# Patient Record
Sex: MALE | Race: WHITE | NOT HISPANIC OR LATINO | Employment: FULL TIME | URBAN - METROPOLITAN AREA
[De-identification: names, ages, dates, MRNs, and addresses within clinical notes are randomized per-mention and may not be internally consistent; named-entity substitution may affect disease eponyms.]

---

## 2019-06-22 ENCOUNTER — OFFICE VISIT (OUTPATIENT)
Dept: URGENT CARE | Facility: CLINIC | Age: 69
End: 2019-06-22
Payer: MEDICARE

## 2019-06-22 VITALS
SYSTOLIC BLOOD PRESSURE: 144 MMHG | BODY MASS INDEX: 32.2 KG/M2 | HEART RATE: 74 BPM | WEIGHT: 230 LBS | DIASTOLIC BLOOD PRESSURE: 86 MMHG | TEMPERATURE: 98 F | HEIGHT: 71 IN | OXYGEN SATURATION: 100 %

## 2019-06-22 DIAGNOSIS — L25.9 CONTACT DERMATITIS, UNSPECIFIED CONTACT DERMATITIS TYPE, UNSPECIFIED TRIGGER: Primary | ICD-10-CM

## 2019-06-22 PROCEDURE — 99203 OFFICE O/P NEW LOW 30 MIN: CPT | Mod: ICN,CMP,S$GLB, | Performed by: PHYSICIAN ASSISTANT

## 2019-06-22 PROCEDURE — 99203 PR OFFICE/OUTPT VISIT, NEW, LEVL III, 30-44 MIN: ICD-10-PCS | Mod: ICN,CMP,S$GLB, | Performed by: PHYSICIAN ASSISTANT

## 2019-06-22 RX ORDER — HYDROXYZINE PAMOATE 25 MG/1
25 CAPSULE ORAL 4 TIMES DAILY
Qty: 30 CAPSULE | Refills: 1 | Status: SHIPPED | OUTPATIENT
Start: 2019-06-22

## 2019-06-22 RX ORDER — ASPIRIN 81 MG/1
81 TABLET ORAL DAILY
COMMUNITY

## 2019-06-22 RX ORDER — ATORVASTATIN CALCIUM 20 MG/1
20 TABLET, FILM COATED ORAL DAILY
COMMUNITY

## 2019-06-22 RX ORDER — TAMSULOSIN HYDROCHLORIDE 0.4 MG/1
0.4 CAPSULE ORAL DAILY
COMMUNITY

## 2019-06-22 RX ORDER — OMEPRAZOLE 20 MG/1
20 CAPSULE, DELAYED RELEASE ORAL DAILY
COMMUNITY

## 2019-06-22 RX ORDER — PREDNISONE 10 MG/1
TABLET ORAL
Qty: 7 TABLET | Refills: 0 | Status: SHIPPED | OUTPATIENT
Start: 2019-06-22

## 2019-06-22 RX ORDER — DILTIAZEM HYDROCHLORIDE 180 MG/1
180 CAPSULE, EXTENDED RELEASE ORAL DAILY
COMMUNITY

## 2019-06-22 RX ORDER — LOSARTAN POTASSIUM 25 MG/1
25 TABLET ORAL DAILY
COMMUNITY

## 2019-06-22 NOTE — PATIENT INSTRUCTIONS
Please monitor glucose and adjust insulin as needed. Take as little steroid you need for symptoms relief.       Poison Ivy Rash  You have a rash and itching. This is a delayed reaction to the oils of the poison ivy plant. You likely came in contact with it during the 3 days before your symptoms began. Your skin will become red and itchy. Small blisters may appear. These can break and leak a clear yellow fluid. This fluid is not contagious. The reaction usually starts to go away after 1 to 2 weeks. But it may take 4 to 6 weeks to fully clear.    Home care  Follow these guidelines when caring for yourself at home:  · The plant oils still on your skin or clothes can be spread to other places on your body. They can also be passed on to other people and cause a similar reaction. Thats why its important to wash all of the plant oils off your skin and any clothes that may have been exposed. Wash all clothes that you were wearing. Use hot water with ordinary laundry detergent.  · Don't use over-the-counter creams that have neomycin or bacitracin. These may make the rash worse.  · Avoid anything that heats up your skin. This includes hot showers or baths, or direct sunlight. These can make itching worse.  · Put a cold compress on areas that are leaking (weeping), or on blistered areas. Do this for 30 minutes 3 times a day. To make a cold compress, dip a wash cloth in a mixture of 1 pint of cold water and 1 packet of astringent or oatmeal bath powder. Keep the solution in the refrigerator for future use.  · If large areas of skin are affected, take a lukewarm bath. Add colloidal oatmeal, or 1 cup of cornstarch or baking soda to the water.  · For a rash in a smaller area, use hydrocortisone cream for redness and irritation. But dont use this if another medicine was prescribed. For severe itching, put an ice pack on the area. To make an ice pack, put ice cubes in a plastic bag that seals at the top. Wrap the bag in a clean,  thin towel or cloth. Never put ice or an ice pack directly on the skin. Over-the-counter products that have calamine lotion may also be helpful.  · You can also use an oral antihistamine medicine with diphenhydramine for itching, unless another medicine was prescribed. This medicine may make you sleepy. So use lower doses during the daytime and higher doses at bedtime. Dont use medicine that has diphenhydramine if you have glaucoma. Also dont use it if you are a man who has trouble urinating because of an enlarged prostate. Antihistamines with loratidine cause less drowsiness. They are a good choice for daytime use.  · For severe cases, your provider may prescribe oral steroid medicines. Always take these exactly as prescribed.  Follow-up care  Follow up with your healthcare provider, or as directed. Call your provider if your rash gets worse or you are not starting to get better after 1 week of treatment.  When to seek medical advice  Call your healthcare provider right away if any of these occur:  · Spreading facial rash with swollen mouth or eyelids  · Rash that spreads to the groin and causes swelling of the penis, scrotum, or vaginal area  · Trouble urinating because of swelling in the genital area  Also call your provider if you have signs of infection in the areas of broken blisters:  · Spreading redness  · Pus or fluid draining from the blisters  · Yellow-brown crusts form over the open blisters  · Fever of 1 degree, or higher, above your normal temperature, or as directed by your provider  Call 911  Call 911 if you have severe swelling on your face, eyelids, mouth, throat, or tongue.  Date Last Reviewed: 8/1/2016 © 2000-2017 [employer]. [address] [place] [address]. All rights reserved. This information is not intended as a substitute for professional medical care. Always follow your healthcare professional's instructions.       If not allergic,take tylenol (acetominophen) for  fever control, chills, or body aches every 4 hours. Do not exceed 4000 mg/ day.If not allergic, take Motrin (Ibuprofen) every 4 hours for fever, chills, pain or inflammation. Do not exceed 2400 mg/day. You can alternate taking tylenol and motrin.  If you were prescribed a narcotic medication, do not drive or operate heavy equipment or machinery while taking these medications.  You must understand that you've received an Urgent Care treatment only and that you may be released before all your medical problems are known or treated. You, the patient, will arrange for follow up care as instructed.  Follow up with your PCP or specialty clinic as directed in the next 1-2 weeks if not improved or as needed.  You can call [phone] to schedule an appointment with the appropriate provider.  If your condition worsens we recommend that you receive another evaluation at the emergency room immediately or contact your primary medical clinics after hours call service to discuss your concerns.  Please return here or go to the Emergency Department for any concerns or worsening of condition.

## 2019-06-22 NOTE — PROGRESS NOTES
"Subjective:       Patient ID: Stalin Braswell is a 69 y.o. male.    Vitals:  height is 5' 10.5" (1.791 m) and weight is 104.3 kg (230 lb). His oral temperature is 98 °F (36.7 °C). His blood pressure is 144/86 (abnormal) and his pulse is 74. His oxygen saturation is 100%.     Chief Complaint: Edema    x4 days pt states began with both eyes itching, now both eyelids are swelling, he has a rash on groin and on his left forearm, all only itch no pain. Pt states cause unknown. Last Benadryl dose today at 11.    Edema   This is a new problem. The current episode started in the past 7 days. The problem occurs constantly. The problem has been gradually worsening. Associated symptoms include a rash. Pertinent negatives include no arthralgias, chills, coughing, fever, joint swelling or sore throat. Nothing aggravates the symptoms. The treatment provided no relief.       Constitution: Negative for chills and fever.   HENT: Negative for facial swelling and sore throat.    Neck: Negative for painful lymph nodes.   Eyes: Positive for eye itching and eyelid swelling.   Respiratory: Negative for cough.    Musculoskeletal: Negative for joint pain and joint swelling.   Skin: Positive for rash. Negative for color change, pale, wound, abrasion, laceration, lesion, skin thickening/induration, puncture wound, erythema, abscess, avulsion and hives.   Allergic/Immunologic: Positive for itching. Negative for environmental allergies, immunocompromised state and hives.   Hematologic/Lymphatic: Negative for swollen lymph nodes.       Objective:      Physical Exam   Constitutional: He is oriented to person, place, and time. He appears well-developed and well-nourished.   HENT:   Head: Normocephalic and atraumatic. Head is without abrasion, without contusion and without laceration.   Right Ear: External ear normal.   Left Ear: External ear normal.   Nose: Nose normal.   Mouth/Throat: Oropharynx is clear and moist.   Eyes: Pupils are equal, " round, and reactive to light. Conjunctivae, EOM and lids are normal.   Neck: Trachea normal, full passive range of motion without pain and phonation normal. Neck supple.   Cardiovascular: Normal rate, regular rhythm and normal heart sounds.   Pulmonary/Chest: Effort normal and breath sounds normal. No stridor. No respiratory distress.   Musculoskeletal: Normal range of motion.   Neurological: He is alert and oriented to person, place, and time.   Skin: Skin is warm, dry and intact. Capillary refill takes less than 2 seconds. Rash noted. No abrasion, no bruising, no burn, no ecchymosis, no laceration and no lesion noted. Rash is maculopapular and urticarial. No erythema.        Psychiatric: He has a normal mood and affect. His speech is normal and behavior is normal. Judgment and thought content normal. Cognition and memory are normal.   Nursing note and vitals reviewed.      Assessment:       1. Contact dermatitis, unspecified contact dermatitis type, unspecified trigger        Plan:         Contact dermatitis, unspecified contact dermatitis type, unspecified trigger  -     predniSONE (DELTASONE) 10 MG tablet; Take one pill x 4 days, then 1/2 pill until empty. If glucose raises too high, taper off as soon as possible as discussed.  Dispense: 7 tablet; Refill: 0  -     hydrOXYzine pamoate (VISTARIL) 25 MG Cap; Take 1 capsule (25 mg total) by mouth 4 (four) times daily.  Dispense: 30 capsule; Refill: 1    r/b of steroid use discussed; patient v/u. Will take minimum effective dose. Patient very well compliant with glucose, insulin monitoring.     Patient Instructions   Please monitor glucose and adjust insulin as needed. Take as little steroid you need for symptoms relief.       Poison Ivy Rash  You have a rash and itching. This is a delayed reaction to the oils of the poison ivy plant. You likely came in contact with it during the 3 days before your symptoms began. Your skin will become red and itchy. Small blisters  may appear. These can break and leak a clear yellow fluid. This fluid is not contagious. The reaction usually starts to go away after 1 to 2 weeks. But it may take 4 to 6 weeks to fully clear.    Home care  Follow these guidelines when caring for yourself at home:  · The plant oils still on your skin or clothes can be spread to other places on your body. They can also be passed on to other people and cause a similar reaction. Thats why its important to wash all of the plant oils off your skin and any clothes that may have been exposed. Wash all clothes that you were wearing. Use hot water with ordinary laundry detergent.  · Don't use over-the-counter creams that have neomycin or bacitracin. These may make the rash worse.  · Avoid anything that heats up your skin. This includes hot showers or baths, or direct sunlight. These can make itching worse.  · Put a cold compress on areas that are leaking (weeping), or on blistered areas. Do this for 30 minutes 3 times a day. To make a cold compress, dip a wash cloth in a mixture of 1 pint of cold water and 1 packet of astringent or oatmeal bath powder. Keep the solution in the refrigerator for future use.  · If large areas of skin are affected, take a lukewarm bath. Add colloidal oatmeal, or 1 cup of cornstarch or baking soda to the water.  · For a rash in a smaller area, use hydrocortisone cream for redness and irritation. But dont use this if another medicine was prescribed. For severe itching, put an ice pack on the area. To make an ice pack, put ice cubes in a plastic bag that seals at the top. Wrap the bag in a clean, thin towel or cloth. Never put ice or an ice pack directly on the skin. Over-the-counter products that have calamine lotion may also be helpful.  · You can also use an oral antihistamine medicine with diphenhydramine for itching, unless another medicine was prescribed. This medicine may make you sleepy. So use lower doses during the daytime and higher  doses at bedtime. Dont use medicine that has diphenhydramine if you have glaucoma. Also dont use it if you are a man who has trouble urinating because of an enlarged prostate. Antihistamines with loratidine cause less drowsiness. They are a good choice for daytime use.  · For severe cases, your provider may prescribe oral steroid medicines. Always take these exactly as prescribed.  Follow-up care  Follow up with your healthcare provider, or as directed. Call your provider if your rash gets worse or you are not starting to get better after 1 week of treatment.  When to seek medical advice  Call your healthcare provider right away if any of these occur:  · Spreading facial rash with swollen mouth or eyelids  · Rash that spreads to the groin and causes swelling of the penis, scrotum, or vaginal area  · Trouble urinating because of swelling in the genital area  Also call your provider if you have signs of infection in the areas of broken blisters:  · Spreading redness  · Pus or fluid draining from the blisters  · Yellow-brown crusts form over the open blisters  · Fever of 1 degree, or higher, above your normal temperature, or as directed by your provider  Call 911  Call 911 if you have severe swelling on your face, eyelids, mouth, throat, or tongue.  Date Last Reviewed: 8/1/2016 © 2000-2017 [employer]. [address] [place] [address]. All rights reserved. This information is not intended as a substitute for professional medical care. Always follow your healthcare professional's instructions.       If not allergic,take tylenol (acetominophen) for fever control, chills, or body aches every 4 hours. Do not exceed 4000 mg/ day.If not allergic, take Motrin (Ibuprofen) every 4 hours for fever, chills, pain or inflammation. Do not exceed 2400 mg/day. You can alternate taking tylenol and motrin.  If you were prescribed a narcotic medication, do not drive or operate heavy equipment or machinery while  taking these medications.  You must understand that you've received an Urgent Care treatment only and that you may be released before all your medical problems are known or treated. You, the patient, will arrange for follow up care as instructed.  Follow up with your PCP or specialty clinic as directed in the next 1-2 weeks if not improved or as needed.  You can call [phone] to schedule an appointment with the appropriate provider.  If your condition worsens we recommend that you receive another evaluation at the emergency room immediately or contact your primary medical clinics after hours call service to discuss your concerns.  Please return here or go to the Emergency Department for any concerns or worsening of condition.

## 2019-06-25 ENCOUNTER — TELEPHONE (OUTPATIENT)
Dept: URGENT CARE | Facility: CLINIC | Age: 69
End: 2019-06-25

## 2022-07-03 RX ORDER — ONDANSETRON 4 MG/1
TABLET, FILM COATED ORAL
COMMUNITY
Start: 2021-10-15

## 2022-07-03 RX ORDER — TRAMADOL HYDROCHLORIDE 50 MG/1
TABLET ORAL
COMMUNITY

## 2022-07-03 RX ORDER — MAGNESIUM OXIDE 400 MG/1
TABLET ORAL
COMMUNITY

## 2022-07-03 RX ORDER — SEMAGLUTIDE 1.34 MG/ML
INJECTION, SOLUTION SUBCUTANEOUS
COMMUNITY
Start: 2022-05-10 | End: 2022-08-25

## 2022-07-03 RX ORDER — PANTOPRAZOLE SODIUM 40 MG/1
TABLET, DELAYED RELEASE ORAL
COMMUNITY

## 2022-07-03 RX ORDER — ACETAMINOPHEN 500 MG
TABLET ORAL
COMMUNITY

## 2022-07-03 RX ORDER — INSULIN ASPART 100 [IU]/ML
INJECTION, SOLUTION INTRAVENOUS; SUBCUTANEOUS
COMMUNITY

## 2022-07-03 RX ORDER — FLUCONAZOLE 150 MG/1
TABLET ORAL
COMMUNITY

## 2022-07-03 RX ORDER — MONTELUKAST SODIUM 5 MG/1
TABLET, CHEWABLE ORAL
COMMUNITY

## 2022-07-03 RX ORDER — LEVOTHYROXINE SODIUM 0.05 MG/1
TABLET ORAL
COMMUNITY

## 2022-07-03 RX ORDER — PREDNISONE 1 MG/1
TABLET ORAL
COMMUNITY

## 2022-07-03 RX ORDER — TAMSULOSIN HYDROCHLORIDE 0.4 MG/1
CAPSULE ORAL
COMMUNITY

## 2022-07-03 RX ORDER — MYCOPHENOLATE MOFETIL 250 MG/1
CAPSULE ORAL
COMMUNITY

## 2022-07-03 RX ORDER — AMLODIPINE BESYLATE 5 MG/1
TABLET ORAL
COMMUNITY

## 2022-07-03 RX ORDER — PSEUDOEPHEDRINE HCL 30 MG
TABLET ORAL
COMMUNITY

## 2022-07-03 RX ORDER — ATORVASTATIN CALCIUM 80 MG/1
TABLET, FILM COATED ORAL
COMMUNITY

## 2022-08-25 PROBLEM — N28.89 HYPERTENSION SECONDARY TO OTHER RENAL DISORDERS: Status: ACTIVE | Noted: 2022-08-25

## 2022-08-25 PROBLEM — I48.91 ATRIAL FIBRILLATION (HCC): Status: ACTIVE | Noted: 2022-08-25

## 2022-08-25 PROBLEM — D47.2 MGUS (MONOCLONAL GAMMOPATHY OF UNKNOWN SIGNIFICANCE): Status: ACTIVE | Noted: 2022-08-25

## 2022-08-25 PROBLEM — E11.9 TYPE 2 DIABETES MELLITUS (HCC): Status: ACTIVE | Noted: 2022-08-25

## 2022-08-25 PROBLEM — E78.5 HYPERLIPIDEMIA: Status: ACTIVE | Noted: 2022-08-25

## 2022-08-25 PROBLEM — I10 ESSENTIAL HYPERTENSION: Status: ACTIVE | Noted: 2022-08-25

## 2022-08-25 PROBLEM — Z94.0 HISTORY OF KIDNEY TRANSPLANT: Status: ACTIVE | Noted: 2022-08-25

## 2022-08-25 PROBLEM — I15.1 HYPERTENSION SECONDARY TO OTHER RENAL DISORDERS: Status: ACTIVE | Noted: 2022-08-25

## 2025-02-06 ENCOUNTER — OFFICE VISIT (OUTPATIENT)
Dept: FAMILY MEDICINE | Facility: CLINIC | Age: 75
End: 2025-02-06
Payer: MEDICARE

## 2025-02-06 VITALS
OXYGEN SATURATION: 97 % | SYSTOLIC BLOOD PRESSURE: 130 MMHG | WEIGHT: 210.13 LBS | HEART RATE: 74 BPM | DIASTOLIC BLOOD PRESSURE: 68 MMHG | BODY MASS INDEX: 30.15 KG/M2

## 2025-02-06 DIAGNOSIS — Z91.81 HISTORY OF FALL: ICD-10-CM

## 2025-02-06 DIAGNOSIS — F43.9 STRESS AT HOME: ICD-10-CM

## 2025-02-06 DIAGNOSIS — Z79.52 LONG TERM (CURRENT) USE OF SYSTEMIC STEROIDS: ICD-10-CM

## 2025-02-06 DIAGNOSIS — Z76.89 ENCOUNTER TO ESTABLISH CARE WITH NEW DOCTOR: Primary | ICD-10-CM

## 2025-02-06 PROBLEM — E83.42 HYPOMAGNESEMIA: Status: ACTIVE | Noted: 2025-02-06

## 2025-02-06 PROBLEM — I25.10 CAD (CORONARY ARTERY DISEASE), NATIVE CORONARY ARTERY: Status: ACTIVE | Noted: 2018-05-01

## 2025-02-06 PROBLEM — E83.51 HYPOCALCEMIA: Status: ACTIVE | Noted: 2025-02-06

## 2025-02-06 PROBLEM — Z79.4 TYPE 2 DIABETES MELLITUS WITH HYPERGLYCEMIA, WITH LONG-TERM CURRENT USE OF INSULIN: Status: ACTIVE | Noted: 2025-02-06

## 2025-02-06 PROBLEM — E11.65 TYPE 2 DIABETES MELLITUS WITH HYPERGLYCEMIA, WITH LONG-TERM CURRENT USE OF INSULIN: Status: ACTIVE | Noted: 2025-02-06

## 2025-02-06 PROBLEM — Z85.828 HISTORY OF SKIN CANCER: Status: ACTIVE | Noted: 2025-02-06

## 2025-02-06 PROBLEM — R73.9 STEROID-INDUCED HYPERGLYCEMIA: Status: ACTIVE | Noted: 2025-02-06

## 2025-02-06 PROBLEM — I48.0 PAROXYSMAL ATRIAL FIBRILLATION: Status: ACTIVE | Noted: 2025-02-06

## 2025-02-06 PROBLEM — T38.0X5A STEROID-INDUCED HYPERGLYCEMIA: Status: ACTIVE | Noted: 2025-02-06

## 2025-02-06 PROBLEM — J45.20 MILD INTERMITTENT ASTHMA WITHOUT COMPLICATION: Status: ACTIVE | Noted: 2025-02-06

## 2025-02-06 PROBLEM — E78.2 MIXED HYPERLIPIDEMIA: Status: ACTIVE | Noted: 2023-06-05

## 2025-02-06 PROBLEM — I25.2 HISTORY OF HEART ATTACK: Status: ACTIVE | Noted: 2025-02-06

## 2025-02-06 PROBLEM — Z94.0 KIDNEY TRANSPLANT STATUS, LIVING UNRELATED DONOR: Status: ACTIVE | Noted: 2025-02-06

## 2025-02-06 PROBLEM — R91.1 LUNG NODULE: Status: ACTIVE | Noted: 2025-02-06

## 2025-02-06 PROBLEM — B45.9 CRYPTOCOCCUS: Status: ACTIVE | Noted: 2025-02-06

## 2025-02-06 PROBLEM — E87.6 HYPOKALEMIA: Status: ACTIVE | Noted: 2025-02-06

## 2025-02-06 PROCEDURE — 99204 OFFICE O/P NEW MOD 45 MIN: CPT | Mod: S$PBB,,, | Performed by: STUDENT IN AN ORGANIZED HEALTH CARE EDUCATION/TRAINING PROGRAM

## 2025-02-06 PROCEDURE — G2211 COMPLEX E/M VISIT ADD ON: HCPCS | Mod: S$PBB,,, | Performed by: STUDENT IN AN ORGANIZED HEALTH CARE EDUCATION/TRAINING PROGRAM

## 2025-02-06 PROCEDURE — 99214 OFFICE O/P EST MOD 30 MIN: CPT | Mod: PBBFAC,PO | Performed by: STUDENT IN AN ORGANIZED HEALTH CARE EDUCATION/TRAINING PROGRAM

## 2025-02-06 RX ORDER — AMMONIUM LACTATE 12 G/100G
LOTION TOPICAL
COMMUNITY
Start: 2024-12-30

## 2025-02-06 RX ORDER — PREDNISONE 5 MG/1
5 TABLET ORAL
COMMUNITY
End: 2025-02-06 | Stop reason: ALTCHOICE

## 2025-02-06 RX ORDER — MELOXICAM 15 MG/1
15 TABLET ORAL
COMMUNITY
Start: 2025-01-27 | End: 2025-02-06 | Stop reason: ALTCHOICE

## 2025-02-06 RX ORDER — METOPROLOL SUCCINATE 25 MG/1
25 TABLET, EXTENDED RELEASE ORAL
COMMUNITY
Start: 2024-12-09

## 2025-02-06 RX ORDER — SEMAGLUTIDE 2.68 MG/ML
2 INJECTION, SOLUTION SUBCUTANEOUS
COMMUNITY

## 2025-02-06 RX ORDER — TACROLIMUS 1 MG/1
2 CAPSULE ORAL
COMMUNITY

## 2025-02-06 RX ORDER — MUPIROCIN 20 MG/G
OINTMENT TOPICAL
COMMUNITY

## 2025-02-06 RX ORDER — TRAMADOL HYDROCHLORIDE 50 MG/1
TABLET ORAL
COMMUNITY

## 2025-02-06 RX ORDER — INSULIN PMP CART,AUT,G6/7,CNTR
EACH SUBCUTANEOUS
COMMUNITY
Start: 2025-01-29

## 2025-02-06 RX ORDER — ACETAMINOPHEN 500 MG
TABLET ORAL
COMMUNITY

## 2025-02-06 RX ORDER — INSULIN ASPART 100 [IU]/ML
INJECTION, SOLUTION INTRAVENOUS; SUBCUTANEOUS
COMMUNITY

## 2025-02-06 RX ORDER — MYCOPHENOLATE MOFETIL 250 MG/1
CAPSULE ORAL
COMMUNITY

## 2025-02-06 RX ORDER — AMLODIPINE BESYLATE 5 MG/1
5 TABLET ORAL
COMMUNITY
Start: 2024-12-09

## 2025-02-06 RX ORDER — ATORVASTATIN CALCIUM 40 MG/1
40 TABLET, FILM COATED ORAL
COMMUNITY
Start: 2024-03-04

## 2025-02-06 RX ORDER — FOLIC ACID 1 MG/1
1 TABLET ORAL
COMMUNITY
Start: 2024-12-09

## 2025-02-06 RX ORDER — FAMOTIDINE 20 MG/1
20 TABLET, FILM COATED ORAL 2 TIMES DAILY
COMMUNITY
Start: 2024-12-09

## 2025-02-06 RX ORDER — ALBUTEROL SULFATE 90 UG/1
INHALANT RESPIRATORY (INHALATION) EVERY 4 HOURS PRN
COMMUNITY

## 2025-02-06 RX ORDER — DIPHENHYDRAMINE HCL 25 MG
25 TABLET ORAL
COMMUNITY

## 2025-02-06 RX ORDER — LANOLIN ALCOHOL/MO/W.PET/CERES
400 CREAM (GRAM) TOPICAL
COMMUNITY

## 2025-02-06 RX ORDER — LEVOTHYROXINE SODIUM 50 UG/1
50 TABLET ORAL
COMMUNITY
Start: 2024-03-29

## 2025-02-06 RX ORDER — MONTELUKAST SODIUM 10 MG/1
10 TABLET ORAL
COMMUNITY

## 2025-02-13 ENCOUNTER — PATIENT OUTREACH (OUTPATIENT)
Dept: ADMINISTRATIVE | Facility: HOSPITAL | Age: 75
End: 2025-02-13
Payer: MEDICARE

## 2025-02-13 NOTE — PROGRESS NOTES
Population Health Chart Review & Patient Outreach Details      Additional Pop Health Notes:               Updates Requested / Reviewed:      Updated Care Coordination Note and External Sources: [facility] and [facility]         Health Maintenance Topics Overdue:      VB Score: 3     Eye Exam  Hemoglobin A1c  Foot Exam    Influenza Vaccine  Shingles/Zoster Vaccine  RSV Vaccine                  Health Maintenance Topic(s) Outreach Outcomes & Actions Taken:    Lab(s) - Outreach Outcomes & Actions Taken  : External Records Requested & Care Team Updated if Applicable    Diabetic Foot Exam - Outreach Outcomes & Actions Taken  : External Records Requested & Care Team Updated if Applicable

## 2025-02-16 PROBLEM — Z79.52 LONG TERM (CURRENT) USE OF SYSTEMIC STEROIDS: Status: ACTIVE | Noted: 2025-02-16

## 2025-02-16 PROBLEM — F43.9 STRESS AT HOME: Status: ACTIVE | Noted: 2025-02-16

## 2025-02-16 PROBLEM — R80.8 OTHER PROTEINURIA: Status: ACTIVE | Noted: 2025-02-16

## 2025-02-17 NOTE — PROGRESS NOTES
Subjective:       Patient ID: Stalin Braswell is a 74 y.o. male who presents to [facility] care. He is new to me and new to this clinic. He has moved here from [place]    Chief Complaint: [facility] Care and Referral    CARDIOVASCULAR:  He has a history of myocardial infarction in 2019 with minimal symptoms at presentation, receiving one stent in [place], [place]. He has a history of atrial fibrillation, with only one documented episode during a CT for Cryptococcus evaluation over three years of continuous cardiac monitoring.    RENAL:  He received a kidney transplant from his wife on December 31, 2019. His creatinine improved from 4-6 to 1-3 within 24 hours post-transplant. He denies any rejection issues and continues follow-up care with [facility] through telehealth appointments and annual in-person visits.    DIABETES:  He recently transitioned from a Medtronic insulin pump to an Omnipod approximately 1.5 months ago, reporting improved ease of use and logic in operation.    RESPIRATORY:  He has a history of lung nodules, which remain unchanged on recent CT. 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 He experiences intermittent asthma symptoms triggered by cold weather or physical exertion, particularly when climbing hills or mountains.    NEUROLOGICAL:  He reports balance issues and short-term disorientation, particularly when changing positions quickly or in dark environments without visual reference points.    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       SOCIAL HISTORY:  He requests referral for marriage counseling.    Plan:  Refer to behavioral health for marriage counseling.  Refer to PT for history of fall.  Stop by the [facility] to get connected to the  portal with [facility].  Return in one month or sooner if needed.  Continues to follow up with [facility] transplant team.  On follow up consider calculate FRAX score due to long term use of steroids.  On follow up will likely need multiple subspecialty referrals - Endocrinology on insulin pump and Synthroid, Dermatology for history of skin cancer, Cardiology for CAD, history of MI, and paroxysmal atrial fibrillation.  Reach out with questions through the portal or call.    Past Medical History:   Diagnosis Date    Diabetes mellitus, type 2     H/O heart artery stent     Heart attack     Kidney disease        Past Surgical History:   Procedure Laterality Date    APPENDECTOMY      BICEPS TENDON REPAIR Left     CATARACT EXTRACTION, BILATERAL      CORONARY ANGIOPLASTY WITH STENT PLACEMENT  04/2018    kidney transplant with flap  04/2019    SURGICAL REMOVAL OF EXOSTOSIS OF TOE Left     2nd toe on left foot    TOE AMPUTATION      glass in it       Review of patient's allergies indicates:   Allergen Reactions    Prednisone Other (See Comments)     Pt states makes his glucose levels increase       Social History[1]    Medications Ordered Prior to Encounter[2]    Family History   Problem Relation Name Age of Onset    No Known Problems Mother      No Known Problems Father         Review of Systems    Objective:      /68   Pulse 74   Wt 95.3 kg (210 lb 1.6 oz)   SpO2 97%   BMI 30.15 kg/m²   Physical Exam    Assessment:           Plan:       Encounter to establish care with new doctor  - Stop by the [facility] to get connected to the portal with [facility].  - Return in one month or sooner if needed.  - Continues to follow up with [facility] transplant team, history of kidney transplant.  - On follow up consider calculate FRAX score due to long term use of steroids.  - On follow up will likely need multiple subspecialty referrals - Endocrinology on insulin pump and Synthroid, Dermatology for history of skin cancer, Cardiology for CAD, history of  MI, and paroxysmal atrial fibrillation.  - Reach out with questions through the portal or call.    History of fall  -     Ambulatory referral/consult to Physical/Occupational Therapy; Future; Expected date: 02/13/2025    Stress at home  -     Ambulatory referral/consult to Behavioral Health; Future; Expected date: 02/13/2025    Long term (current) use of systemic steroids  - On follow up consider calculation of FRAX score.      Counseled on regular exercise, maintenance of a healthy weight, balanced diet rich in fruits/vegetables and lean protein, and avoidance of unhealthy habits like smoking and excessive alcohol intake.      Follow up in about 4 weeks (around 3/6/2025) for return for frax score.    This note was generated with the assistance of ambient listening technology. Verbal consent was obtained by the patient and accompanying visitor(s) for the recording of patient appointment to facilitate this note. I attest to having reviewed and edited the generated note for accuracy, though some syntax or spelling errors may persist. Please contact the author of this note for any clarification.    Visit today included increased complexity associated with the care of the episodic problem stress at home addressed and managing the longitudinal care of the patient due to the serious and/or complex managed problem(s) long term (current) use of systemic steroids.             [1]   Social History  Socioeconomic History    Marital status:    Tobacco Use    Smoking status: Never    Smokeless tobacco: Never     Social Drivers of Health     Financial Resource Strain: Low Risk  (2/6/2025)    Overall Financial Resource Strain (CARDIA)     Difficulty of Paying Living Expenses: Not hard at all   Food Insecurity: No Food Insecurity (2/6/2025)    Hunger Vital Sign     Worried About Running Out of Food in the Last Year: Never true     Ran Out of Food in the Last Year: Never true   Physical Activity: Sufficiently Active  (2/6/2025)    Exercise Vital Sign     Days of Exercise per Week: 5 days     Minutes of Exercise per Session: 30 min   Stress: Stress Concern Present (2/6/2025)    [employer] - Occupational Stress Questionnaire     Feeling of Stress : Rather much   Housing Stability: Unknown (2/6/2025)    Housing Stability Vital Sign     Unable to Pay for Housing in the Last Year: No   [2]   Current Outpatient Medications on File Prior to Visit   Medication Sig Dispense Refill    acetaminophen (TYLENOL) 500 MG tablet every 4 hours as needed (Patient taking differently: as needed.)      albuterol (PROVENTIL/VENTOLIN HFA) 90 mcg/actuation inhaler Inhale into the lungs every 4 (four) hours as needed.      amLODIPine (NORVASC) 5 MG tablet Take 5 mg by mouth.      ammonium lactate (LAC-HYDRIN) 12 % lotion Apply topically.      aspirin (ECOTRIN) 81 MG EC tablet Take 81 mg by mouth once daily.      atorvastatin (LIPITOR) 40 MG tablet Take 40 mg by mouth.      clobetasol 0.05% (TEMOVATE) 0.05 % Oint Apply twice a day to affected areas of skin on body until improved, up to 4-6 weeks, then can stop and restart as needed 60 g 1    diphenhydrAMINE (SOMINEX) 25 mg tablet Take 25 mg by mouth. (Patient taking differently: Take 25 mg by mouth as needed for Allergies.)      famotidine (PEPCID) 20 MG tablet Take 20 mg by mouth 2 (two) times daily.      folic acid (FOLVITE) 1 MG tablet Take 1 mg by mouth as needed.      levothyroxine (SYNTHROID) 50 MCG tablet Take 50 mcg by mouth.      losartan (COZAAR) 25 MG tablet Take 25 mg by mouth once daily.      magnesium oxide (MAG-OX) 400 mg (241.3 mg magnesium) tablet Take 400 mg by mouth.      metoprolol succinate (TOPROL-XL) 25 MG 24 hr tablet Take 25 mg by mouth.      montelukast (SINGULAIR) 10 mg tablet Take 10 mg by mouth.      mupirocin (BACTROBAN) 2 % ointment Apply topically.      mycophenolate (CELLCEPT) 250 mg Cap Take by mouth.      NOVOLOG FLEXPEN U-100 INSULIN 100  unit/mL (3 mL) InPn pen uses up to 50 units daily Subcutaneous for 30 days      NOVOLOG U-100 INSULIN ASPART 100 unit/mL injection 100 units Injection via pump daily for 30 days      OMNIPOD 5 G6-G7 PODS, GEN 5, Crtg Inject into the skin.      OZEMPIC 2 mg/dose (8 mg/3 mL) PnIj Inject 2 mg into the skin.      tacrolimus (PROGRAF) 1 MG Cap Take 2 mg by mouth.      tamsulosin (FLOMAX) 0.4 mg Cap Take 0.4 mg by mouth once daily.      traMADoL (ULTRAM) 50 mg tablet 1 tablet as needed Orally every 6 hrs (Patient taking differently: as needed.)      apixaban (ELIQUIS ORAL) Take by mouth. (Patient not taking: Reported on 2/6/2025)      insulin glargine,hum.rec.anlog (LANTUS SOLOSTAR U-100 INSULIN SUBQ) Inject 40 mLs into the skin once daily. (Patient not taking: Reported on 2/6/2025)      PREDNISONE ORAL Take 3 mg by mouth once daily.       No current facility-administered medications on file prior to visit.

## 2025-02-18 ENCOUNTER — CLINICAL SUPPORT (OUTPATIENT)
Dept: REHABILITATION | Facility: HOSPITAL | Age: 75
End: 2025-02-18
Attending: STUDENT IN AN ORGANIZED HEALTH CARE EDUCATION/TRAINING PROGRAM
Payer: MEDICARE

## 2025-02-18 DIAGNOSIS — R26.81 GAIT INSTABILITY: Primary | ICD-10-CM

## 2025-02-18 DIAGNOSIS — Z91.81 HISTORY OF FALL: ICD-10-CM

## 2025-02-18 DIAGNOSIS — M62.81 MUSCLE WEAKNESS: ICD-10-CM

## 2025-02-18 PROCEDURE — 97162 PT EVAL MOD COMPLEX 30 MIN: CPT | Mod: PN | Performed by: PHYSICAL THERAPIST

## 2025-02-18 PROCEDURE — 97112 NEUROMUSCULAR REEDUCATION: CPT | Mod: PN | Performed by: PHYSICAL THERAPIST

## 2025-02-19 DIAGNOSIS — E11.9 TYPE 2 DIABETES MELLITUS WITHOUT COMPLICATION: ICD-10-CM

## 2025-02-26 DIAGNOSIS — E11.9 TYPE 2 DIABETES MELLITUS WITHOUT COMPLICATION: ICD-10-CM

## 2025-02-27 ENCOUNTER — TELEPHONE (OUTPATIENT)
Dept: REHABILITATION | Facility: HOSPITAL | Age: 75
End: 2025-02-27
Payer: MEDICARE

## 2025-03-05 DIAGNOSIS — E11.9 TYPE 2 DIABETES MELLITUS WITHOUT COMPLICATION: ICD-10-CM

## 2025-03-07 ENCOUNTER — PATIENT MESSAGE (OUTPATIENT)
Dept: ADMINISTRATIVE | Facility: HOSPITAL | Age: 75
End: 2025-03-07
Payer: MEDICARE

## 2025-03-07 ENCOUNTER — OFFICE VISIT (OUTPATIENT)
Dept: FAMILY MEDICINE | Facility: CLINIC | Age: 75
End: 2025-03-07
Payer: MEDICARE

## 2025-03-07 VITALS
DIASTOLIC BLOOD PRESSURE: 62 MMHG | HEART RATE: 67 BPM | OXYGEN SATURATION: 97 % | WEIGHT: 206.56 LBS | SYSTOLIC BLOOD PRESSURE: 128 MMHG | HEIGHT: 70 IN | BODY MASS INDEX: 29.57 KG/M2

## 2025-03-07 DIAGNOSIS — I25.10 CORONARY ARTERY DISEASE INVOLVING NATIVE CORONARY ARTERY OF NATIVE HEART WITHOUT ANGINA PECTORIS: ICD-10-CM

## 2025-03-07 DIAGNOSIS — Z79.52 LONG TERM (CURRENT) USE OF SYSTEMIC STEROIDS: ICD-10-CM

## 2025-03-07 DIAGNOSIS — Z94.0 KIDNEY TRANSPLANT STATUS, LIVING UNRELATED DONOR: ICD-10-CM

## 2025-03-07 DIAGNOSIS — E03.9 HYPOTHYROIDISM, UNSPECIFIED TYPE: ICD-10-CM

## 2025-03-07 DIAGNOSIS — R26.89 BALANCE PROBLEM: Primary | ICD-10-CM

## 2025-03-07 DIAGNOSIS — I48.0 PAROXYSMAL ATRIAL FIBRILLATION: ICD-10-CM

## 2025-03-07 PROCEDURE — 99999 PR PBB SHADOW E&M-EST. PATIENT-LVL V: CPT | Mod: PBBFAC,,, | Performed by: NURSE PRACTITIONER

## 2025-03-07 PROCEDURE — 99215 OFFICE O/P EST HI 40 MIN: CPT | Mod: PBBFAC,PO | Performed by: NURSE PRACTITIONER

## 2025-03-07 RX ORDER — LEVOTHYROXINE SODIUM 50 UG/1
50 TABLET ORAL
Qty: 90 TABLET | Refills: 3 | Status: SHIPPED | OUTPATIENT
Start: 2025-03-07

## 2025-03-07 RX ORDER — MELOXICAM 15 MG/1
15 TABLET ORAL
COMMUNITY
Start: 2025-02-10

## 2025-03-07 NOTE — PROGRESS NOTES
"Subjective     Patient ID: Stalin Braswell is a 74 y.o. male.    Chief Complaint: 1 month f/u      HPI      Patient is new to me. PCP is Dr. Bean.     73 y/o M with CAD, hx of MI, HLD, PAF, Kidney transplant on long-term steroids, DM2, gait instability presents to clinic for 1 mo followup. Saw Dr. Bean last month. She referred him to PT. He did not like the PT that he saw at [facility]. He would like to go elsewhere. He reports that he told her it was probably something to do with his eyes because she did not see any problems with his gait. Will refer to [facility]. He is DM2- on Omnipod insulin pump with Dexcom G7-sees Dr. Salinas for endo. His cardiologist is Dr. Ervin.     Review of Systems   Respiratory:  Negative for shortness of breath.    Cardiovascular:  Negative for chest pain, palpitations and leg swelling.   Neurological:  Negative for headaches.        Feels balance is a little off especially at night when he has to get up to go to the bathroom.           Objective   Vitals:    03/07/25 0852   BP: 128/62   Pulse: 67   SpO2: 97%   Weight: 93.7 kg (206 lb 9.1 oz)   Height: 5' 10" (1.778 m)      Physical Exam  Constitutional:       General: He is not in acute distress.     Appearance: Normal appearance. He is not ill-appearing, toxic-appearing or diaphoretic.   HENT:      Head: Normocephalic and atraumatic.   Cardiovascular:      Heart sounds: Normal heart sounds. No murmur heard.     No friction rub. No gallop.   Pulmonary:      Effort: No respiratory distress.      Breath sounds: Normal breath sounds. No stridor. No wheezing, rhonchi or rales.   Chest:      Chest wall: No tenderness.   Musculoskeletal:      Right lower leg: No edema.      Left lower leg: No edema.   Neurological:      General: No focal deficit present.      Mental Status: He is alert and oriented to person, place, and time. Mental status is at baseline.   Psychiatric:         Mood and Affect: Mood normal.         Behavior: " Behavior normal.         Thought Content: Thought content normal.         Judgment: Judgment normal.         1. Balance problem  - Ambulatory referral/consult to Physical/Occupational Therapy; Future    2. Long term (current) use of systemic steroids  - DXA Bone Density Axial Skeleton 1 or more sites; Future    3. Coronary artery disease involving native coronary artery of native heart without angina pectoris  -cont ASA, Statin    4. Paroxysmal atrial fibrillation  Hx of fall, on metoprolol, regular rhythm today    5. Kidney transplant status, living unrelated donor   Sees [facility] in [place].     6. Hypothyroidism  -refill levothyroxine 50mg  -TSH stable    RTC/ER precautions given. F/U 3 mo with PCP.    Counseled on regular exercise, maintenance of a healthy weight, balanced diet rich in fruits/vegetables and lean protein, and avoidance of unhealthy habits like smoking and excessive alcohol intake.    Kirstin Lawson, CARLOS-C

## 2025-03-21 ENCOUNTER — HOSPITAL ENCOUNTER (OUTPATIENT)
Dept: RADIOLOGY | Facility: HOSPITAL | Age: 75
Discharge: HOME | End: 2025-03-21
Attending: NURSE PRACTITIONER
Payer: MEDICARE

## 2025-03-21 DIAGNOSIS — Z79.52 LONG TERM (CURRENT) USE OF SYSTEMIC STEROIDS: ICD-10-CM

## 2025-03-21 PROCEDURE — 77080 DXA BONE DENSITY AXIAL: CPT | Mod: TC,PO

## 2025-03-21 PROCEDURE — 77080 DXA BONE DENSITY AXIAL: CPT | Mod: 26,,, | Performed by: STUDENT IN AN ORGANIZED HEALTH CARE EDUCATION/TRAINING PROGRAM

## 2025-03-24 ENCOUNTER — RESULTS FOLLOW-UP (OUTPATIENT)
Dept: FAMILY MEDICINE | Facility: CLINIC | Age: 75
End: 2025-03-24

## 2025-06-09 ENCOUNTER — PATIENT OUTREACH (OUTPATIENT)
Dept: ADMINISTRATIVE | Facility: HOSPITAL | Age: 75
End: 2025-06-09
Payer: MEDICARE

## 2025-06-09 ENCOUNTER — OFFICE VISIT (OUTPATIENT)
Dept: FAMILY MEDICINE | Facility: CLINIC | Age: 75
End: 2025-06-09
Payer: MEDICARE

## 2025-06-09 VITALS
OXYGEN SATURATION: 98 % | DIASTOLIC BLOOD PRESSURE: 68 MMHG | WEIGHT: 202.06 LBS | HEIGHT: 70 IN | BODY MASS INDEX: 28.93 KG/M2 | RESPIRATION RATE: 16 BRPM | HEART RATE: 62 BPM | SYSTOLIC BLOOD PRESSURE: 128 MMHG

## 2025-06-09 DIAGNOSIS — R05.8 PRODUCTIVE COUGH: ICD-10-CM

## 2025-06-09 DIAGNOSIS — B45.9 CRYPTOCOCCUS: Primary | ICD-10-CM

## 2025-06-09 DIAGNOSIS — E11.42 POLYNEUROPATHY DUE TO TYPE 2 DIABETES MELLITUS: ICD-10-CM

## 2025-06-09 PROBLEM — Z95.5 H/O HEART ARTERY STENT: Status: ACTIVE | Noted: 2025-06-09

## 2025-06-09 PROCEDURE — 99215 OFFICE O/P EST HI 40 MIN: CPT | Mod: PBBFAC,PO | Performed by: STUDENT IN AN ORGANIZED HEALTH CARE EDUCATION/TRAINING PROGRAM

## 2025-06-09 PROCEDURE — 99999 PR PBB SHADOW E&M-EST. PATIENT-LVL V: CPT | Mod: PBBFAC,,, | Performed by: STUDENT IN AN ORGANIZED HEALTH CARE EDUCATION/TRAINING PROGRAM

## 2025-06-09 RX ORDER — HYDROCODONE BITARTRATE AND ACETAMINOPHEN 5; 325 MG/1; MG/1
1 TABLET ORAL
COMMUNITY
Start: 2025-05-27

## 2025-06-09 NOTE — Clinical Note
Dr. Preciado retinal specialist last diabetic eye check - [email or web address]; At [facility] also sees Dr. Carlos A Ortega: [email or web address]

## 2025-06-09 NOTE — PATIENT INSTRUCTIONS
I recommend updating the shingles vaccine at your pharmacy.    Refer to pulmonology to for productive cough over the last six weeks and history of pulmonary cryptococcus.    Follow up in six months or sooner if needed.

## 2025-06-09 NOTE — LETTER
FAX      AUTHORIZATION FOR RELEASE OF   CONFIDENTIAL INFORMATION      Dr. Bhagat,      We are seeing Stalin Braswell, date of birth 1950, in the clinic at [facility]. Maria Teresa Bean DO is the patient's PCP. Stalin Braswell has an outstanding lab/procedure at the time we reviewed their chart. In order to help keep their health information updated, Stalin Braswell has authorized us to request the following medical records:        EYE EXAM - WITH RETINAL SCREENING (MOST RECENT WITHIN 48 MONTHS)          Please fax records to Maria Teresa Bean DO at [facility] at [phone] or email to [email or web address].          If you have any questions, please contact Shanika at [phone]    Thank you,    Shanika Orellana LPN LPN Clinical Care Coordinator  [facility]                     Stalin Braswell  MRN: [id number]  : 1950  Age: 74 y.o.  Sex: male         Patient/Legal Guardian Signature  This signature was collected at 2025           _______________________________   Printed Name/Relationship to Patient      Consent for Examination and Treatment: I hereby authorize the providers and employees of [facility] ([facility]) to provide medical treatment/services which includes, but is not limited to, performing and administering tests and diagnostic procedures that are deemed necessary, including, but not limited to, imaging examinations, blood tests and other laboratory procedures as may be required by the hospital, clinic, or may be ordered by my physician(s) or persons working under the general and/or special instructions of my physician(s).      I understand and agree that this consent covers all authorized persons, including but not limited to physicians, residents, nurse practitioners, physicians' assistants, specialists, consultants, student nurses, and independently contracted physicians,  who are called upon by the physician in charge, to carry out the diagnostic procedures and medical or surgical treatment.     I hereby authorize [facility] to retain or dispose of any specimens or tissue, should there be such remaining from any test or procedure.     I hereby authorize and give consent for [facility] providers and employees to take photographs, images or videotapes of such diagnostic, surgical or treatment procedures of Patient as may be required by [facility] or as may be ordered by a physician. I further acknowledge and agree that [facility] may use cameras or other devices for patient monitoring.     I am aware that the practice of medicine is not an exact science, and I acknowledge that no guarantees have been made to me as to the outcome of any tests, procedures or treatment.     Authorization for Release of Information: I understand that my insurance company and/or their agents may need information necessary to make determinations about payment/reimbursement. I hereby provide authorization to release to all insurance companies, their successors, assignees, other parties with whom they may have contracted, or others acting on their behalf, that are involved with payment for any hospital and/or clinic charges incurred by the patient, any information that they request and deem necessary for payment/reimbursement, and/or quality review.  I further authorize the release of my health information to physicians or other health care practitioners on staff who are involved in my health care now and in the future, and to other health care providers, entities, or institutions for the purpose of my continued care and treatment, including referrals.     REGISTRATION AUTHORIZATION  Form No. 22144 (Rev. 3/25/2024)    Page 1 of 3                       Medicare Patient's Certification and Authorization to Release Information and Payment Request:  I certify that the information given by me in applying for payment under  Title XVIII of the Social Security Act is correct. I authorize any golden of medical or other information about me to release to the Social SecurityAdministration, or its intermediaries or carriers, any information needed for this or a related Medicare claim. I request that payment of authorized benefits be made on my behalf.     Assignment of Insurance Benefits:   I hereby authorize any and all insurance companies, health plans, defined   benefit plans, health insurers or any entity that is or may be responsible for payment of my medical expenses to pay all hospital and medical benefits now due, and to become due and payable to me under any hospital benefits, sick benefits, injury benefits or any other benefit for services rendered to me, including Major Medical Benefits, direct to [facility] and all independently contracted physicians. I assign any and all rights that I may have against any and all insurance companies, health plans, defined benefit plans, health insurers or any entity that is or may be responsible for payment of my medical expenses, including, but not limited to any right to appeal a denial of a claim, any right to bring any action, lawsuit, administrative proceeding, or other cause of action on my behalf. I specifically assign my right to pursue litigation against any and all insurance companies, health plans, defined benefit plans, health insurers or any entity that is or may be responsible for payment of my medical expenses based upon a refusal to pay charges.            E. Valuables: It is understood and agreed that [facility] is not liable for the damage to or loss of any money, jewelry,   documents, dentures, eye glasses, hearing aids, prosthetics, or other property of value.     F. Computer Equipment: I understand and agree that should I choose to use computer equipment owned by [facility] or if I choose to access the Internet via [facility]s network, I do so at my own risk. [facility] is not responsible  for any damage to my computer equipment or to any damages of any type that might arise from my loss of equipment or data.     G. Acceptance of Financial Responsibility:  I agree that in consideration of the services and   supplies that have been   or will be furnished to the patient, I am hereby obligated to pay all charges made for or on the account of the patient according to the standard rates (in effect at the time the services and supplies are delivered) established by [facility], including its Patient Financial Assistance Policy to the extent it is applicable. I understand that I am responsible for all charges, or portions thereof, not covered by insurance or other sources. Patient refunds will be distributed only after balances at all [facility] facilities are paid.     H. Communication Authorization:  I hereby authorize [facility] and its representatives, along with any billing service   or  who may work on their behalf, to contact me on   my cell phone and/or home phone using pre- recorded messages, artificial voice messages, automatic telephone dialing devices or other computer assisted technology, or by electronic      mail, text messaging, or by any other form of electronic communication. This includes, but is not limited to, appointment reminders, yearly physical exam reminders, preventive care reminders, patient campaigns, welcome calls, and calls about account balances on my account or any account on which I am listed as a guarantor. I understand I have the right to opt out of these communications at any time.      Relationship  Between  Facility and  Provider:      I understand that some, but not all, providers furnishing services to the patient are not employees or agents of [facility]. The patient is under the care and supervision of his/her attending physician, and it is the responsibility of the facility and its nursing staff to carry out the instructions of such physicians. It is the  responsibility of the patient's physician/designee to obtain the patient's informed consent, when required, for medical or surgical treatment, special diagnostic or therapeutic procedures, or hospital services rendered for the patient under the special instructions of the physician/designee.           REGISTRATION AUTHORIZATION  Form No. 35903 (Rev. 3/25/2024)    Page 2 of 3                       Immunizations: [facility] shares immunization information with state sponsored health departments to help you and your doctor keep track of your immunization records. By signing, you consent to have this information shared with the health department in your state:                                [place] - LINKS ([place] Immunization Network for Kids Statewide)                                [place] - MIIX ([place] Immunization Information eXchange)                                [place] - ImmPRINT (Immunization Patient Registry with Integrated Technology)     TERM: This authorization is valid for this and subsequent care/treatment I receive at [facility] and will remain valid unless/until revoked in writing by me.     [facility]: As used in this document, [facility] means all [facility] owned and managed facilities, including, but not limited to, all health centers, surgery centers, clinics, urgent care centers, and hospitals.         [facility] complies with applicable Federal civil rights laws and does not discriminate on the basis of race, color, national origin, age, disability, or sex.  ATENCIÓN: si inocencia dye, tiene a kothari disposición servicios gratuitos de asistencia lingüística. Llame al [phone].  CHÚ Ý: N?u b?n nói Ti?ng Vi?t, có các d?ch v? h? tr? ngôn ng? mi?n phí dành cho b?n. G?i s? [phone].        REGISTRATION AUTHORIZATION  Form No. 19782 (Rev. 3/25/2024)   Page 3 of 3    Patient Name: Stalin Braswell  : 1950  Patient Phone #: [phone]

## 2025-06-09 NOTE — PROGRESS NOTES
Population Health Chart Review & Patient Outreach Details      Additional [facility] Health Notes:               Updates Requested / Reviewed:      Updated Care Coordination Note         Health Maintenance Topics Overdue:      [facility] Score: 1     Eye Exam    Shingles/Zoster Vaccine  RSV Vaccine                  Health Maintenance Topic(s) Outreach Outcomes & Actions Taken:    Eye Exam - Outreach Outcomes & Actions Taken  : External Records Requested & Care Team Updated if Applicable

## 2025-06-09 NOTE — PROGRESS NOTES
Subjective:       Patient ID: Stalin Braswell is a 75 y.o. male.    Chief Complaint: Follow-up (3 months)    HPI  75-year-old male with complex medical history presents with request for referral to pulmonology - the patient has copies of outside previous imaging for cryptococcal pneumonia. He previously completed six months of antifungal therapy but over the last six weeks he has increased productive cough. He talked with Dr. Trujillo in [place] but would like to establish here. He saw a counselor but discontinued therapy. Stress is less at home, though they are renovating. He does not think construction dust is contributing to cough. He is taking 10 day trips up to [place] for short visits instead of typical 5-6 month summer visit, so they can be home for renovations adding on 1700 square feet to their home. After last visit he had bone density test which was within normal. Return for follow up in six months or sooner if needed. Of note, vital signs today at first showed 79%. On recheck, they are improved. It is thought this is likely artifact not likely to be a true measurement.    He follows with specialists:  - Dr. Johnston, dermatology (history of skin cancer; on immunosuppression for kidney transplant April 2019)  - Dr. Vince Ervin for cardiology, [facility] (CAD s/p stent 2018)  - Dr. Salinas for diabetes on Omnipod  - Dr. 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 Preciado retinal specialist  - Dr. Carlos A Ortega, ophthalmology    Past Medical History:   Diagnosis Date    Diabetes mellitus, type 2     H/O heart artery stent     Heart attack     Kidney disease        Past Surgical History:   Procedure Laterality Date    APPENDECTOMY      BICEPS TENDON REPAIR Left     CATARACT EXTRACTION, BILATERAL      CORONARY ANGIOPLASTY WITH STENT PLACEMENT  04/2018    kidney transplant with flap  04/2019    SURGICAL REMOVAL OF EXOSTOSIS OF TOE Left      "2nd toe on left foot    TOE AMPUTATION      glass in it       Review of patient's allergies indicates:   Allergen Reactions    Iodides Other (See Comments) and Rash    Iodinated contrast media Other (See Comments) and Rash    Iodine Other (See Comments) and Rash     Other reaction(s): kidney problems   Event:      Other reaction(s): kidney problems Event:        Other reaction(s): kidney problems Event:    Barium sulfate     Prednisone Other (See Comments)     Pt states makes his glucose levels increase       Social History[1]    Medications Ordered Prior to Encounter[2]    Family History   Problem Relation Name Age of Onset    No Known Problems Mother      No Known Problems Father         Review of Systems      Objective:      /68 (BP Location: Left arm, Patient Position: Sitting)   Pulse 62   Resp 16   Ht 5' 10" (1.778 m)   Wt 91.7 kg (202 lb 0.8 oz)   SpO2 98%   BMI 28.99 kg/m²   Physical Exam  Constitutional:       General: He is not in acute distress.     Appearance: He is not ill-appearing or toxic-appearing.   Cardiovascular:      Rate and Rhythm: Normal rate and regular rhythm.      Heart sounds: Normal heart sounds.   Pulmonary:      Effort: Pulmonary effort is normal.      Breath sounds: Normal breath sounds.   Neurological:      General: No focal deficit present.      Mental Status: He is alert.      Comments: Mild dysarthria   Psychiatric:         Mood and Affect: Mood normal.         Behavior: Behavior normal.         Assessment:       1. History of pulmonary Cryptococcus    2. Productive cough    3. Polyneuropathy due to type 2 diabetes mellitus        Plan:       History of pulmonary Cryptococcus  -     Ambulatory referral/consult to Pulmonology; Future; Expected date: 06/16/2025    Productive cough  -     Ambulatory referral/consult to Pulmonology; Future; Expected date: 06/16/2025    Polyneuropathy due to type 2 diabetes mellitus  - Recent hemoglobin A1c with outside endocrinology,  " Brad at 8.3% on Omnipod - much improved from 5/28/24 at 11.1%.  - Asked Care Coordination to update record with most recent diabetic eye exam with Dr. Preciado and Dr. Ortega.     Return in six months or sooner if needed.           [1]   Social History  Socioeconomic History    Marital status:    Tobacco Use    Smoking status: Never    Smokeless tobacco: Never     Social Drivers of Health     Financial Resource Strain: Low Risk  (2/6/2025)    Overall Financial Resource Strain (CARDIA)     Difficulty of Paying Living Expenses: Not hard at all   Food Insecurity: No Food Insecurity (2/6/2025)    Hunger Vital Sign     Worried About Running Out of Food in the Last Year: Never true     Ran Out of Food in the Last Year: Never true   Physical Activity: Sufficiently Active (2/6/2025)    Exercise Vital Sign     Days of Exercise per Week: 5 days     Minutes of Exercise per Session: 30 min   Stress: Stress Concern Present (2/6/2025)    [employer] [place] [employer] - Occupational Stress Questionnaire     Feeling of Stress : Rather much   Housing Stability: Unknown (2/6/2025)    Housing Stability Vital Sign     Unable to Pay for Housing in the Last Year: No   [2]   Current Outpatient Medications on File Prior to Visit   Medication Sig Dispense Refill    acetaminophen (TYLENOL) 500 MG tablet every 4 hours as needed      albuterol (PROVENTIL/VENTOLIN HFA) 90 mcg/actuation inhaler Inhale into the lungs every 4 (four) hours as needed.      amLODIPine (NORVASC) 5 MG tablet Take 5 mg by mouth.      ammonium lactate (LAC-HYDRIN) 12 % lotion Apply topically.      aspirin (ECOTRIN) 81 MG EC tablet Take 81 mg by mouth once daily.      atorvastatin (LIPITOR) 40 MG tablet Take 40 mg by mouth.      clobetasol 0.05% (TEMOVATE) 0.05 % Oint Apply twice a day to affected areas of skin on body until improved, up to 4-6 weeks, then can stop and restart as needed 60 g 1    diphenhydrAMINE (SOMINEX) 25 mg tablet Take 25 mg by  mouth.      famotidine (PEPCID) 20 MG tablet Take 20 mg by mouth 2 (two) times daily.      folic acid (FOLVITE) 1 MG tablet Take 1 mg by mouth as needed.      HYDROcodone-acetaminophen (NORCO) 5-325 mg per tablet Take 1 tablet by mouth.      levothyroxine (SYNTHROID) 50 MCG tablet Take 1 tablet (50 mcg total) by mouth before breakfast. 90 tablet 3    losartan (COZAAR) 25 MG tablet Take 25 mg by mouth once daily.      magnesium oxide (MAG-OX) 400 mg (241.3 mg magnesium) tablet Take 400 mg by mouth.      meloxicam (MOBIC) 15 MG tablet Take 15 mg by mouth.      metoprolol succinate (TOPROL-XL) 25 MG 24 hr tablet Take 25 mg by mouth.      montelukast (SINGULAIR) 10 mg tablet Take 10 mg by mouth.      mupirocin (BACTROBAN) 2 % ointment Apply topically.      mycophenolate (CELLCEPT) 250 mg Cap Take by mouth.      NOVOLOG FLEXPEN U-100 INSULIN 100 unit/mL (3 mL) InPn pen uses up to 50 units daily Subcutaneous for 30 days      NOVOLOG U-100 INSULIN ASPART 100 unit/mL injection 100 units Injection via pump daily for 30 days      OMNIPOD 5 G6-G7 PODS, GEN 5, Crtg Inject into the skin.      OZEMPIC 2 mg/dose (8 mg/3 mL) PnIj Inject 2 mg into the skin.      PREDNISONE ORAL Take 3 mg by mouth once daily.      tacrolimus (PROGRAF) 1 MG Cap Take 2 mg by mouth.      tamsulosin (FLOMAX) 0.4 mg Cap Take 0.4 mg by mouth once daily.      traMADoL (ULTRAM) 50 mg tablet 1 tablet as needed Orally every 6 hrs       No current facility-administered medications on file prior to visit.

## 2025-06-30 ENCOUNTER — PATIENT OUTREACH (OUTPATIENT)
Dept: ADMINISTRATIVE | Facility: HOSPITAL | Age: 75
End: 2025-06-30
Payer: MEDICARE

## 2025-06-30 NOTE — PROGRESS NOTES
Population Health Chart Review & Patient Outreach Details      Additional [facility] Notes:               Updates Requested / Reviewed:      Updated Care Coordination Note         Health Maintenance Topics Overdue:      [facility] Score: 1     Eye Exam    Shingles/Zoster Vaccine  RSV Vaccine                  Health Maintenance Topic(s) Outreach Outcomes & Actions Taken:    Eye Exam - Outreach Outcomes & Actions Taken  : Second request sent to Dr Bhagat

## 2025-07-08 ENCOUNTER — OFFICE VISIT (OUTPATIENT)
Dept: PULMONOLOGY | Facility: CLINIC | Age: 75
End: 2025-07-08
Payer: MEDICARE

## 2025-07-08 VITALS
HEART RATE: 72 BPM | DIASTOLIC BLOOD PRESSURE: 89 MMHG | HEIGHT: 70 IN | WEIGHT: 212.31 LBS | OXYGEN SATURATION: 98 % | BODY MASS INDEX: 30.39 KG/M2 | SYSTOLIC BLOOD PRESSURE: 170 MMHG

## 2025-07-08 DIAGNOSIS — J18.9 PNEUMONIA DUE TO INFECTIOUS ORGANISM, UNSPECIFIED LATERALITY, UNSPECIFIED PART OF LUNG: ICD-10-CM

## 2025-07-08 DIAGNOSIS — B45.0 CRYPTOCOCCAL PNEUMONITIS: ICD-10-CM

## 2025-07-08 DIAGNOSIS — D84.9 IMMUNOSUPPRESSION: Primary | ICD-10-CM

## 2025-07-08 DIAGNOSIS — R05.8 PRODUCTIVE COUGH: ICD-10-CM

## 2025-07-08 DIAGNOSIS — Z94.0 KIDNEY TRANSPLANT STATUS, LIVING UNRELATED DONOR: ICD-10-CM

## 2025-07-08 DIAGNOSIS — B45.9 CRYPTOCOCCUS: ICD-10-CM

## 2025-07-08 PROCEDURE — 99999 PR PBB SHADOW E&M-EST. PATIENT-LVL V: CPT | Mod: PBBFAC,,, | Performed by: INTERNAL MEDICINE

## 2025-07-08 PROCEDURE — 99215 OFFICE O/P EST HI 40 MIN: CPT | Mod: PBBFAC | Performed by: INTERNAL MEDICINE

## 2025-07-08 NOTE — PROGRESS NOTES
Subjective:       Patient ID: Stalin Braswell is a 75 y.o. male.    Chief Complaint: Establish Care (Patient states he had cryptococcus infection. )    76 yo male with complicated medical history who presents to [facility]. He had a living donor kidney transplant in 2019. 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 He had lung nodules noted in 2024 which were stable over time but not able to compare to imaging in 2022 cryptococcal infection.  He continues to follow with [facility] in [place] for most of his care.     He started having a new productive cough 3-4 months ago. It has persisted and is now more productive. He coughs up thick mucus about 15 x per day. Described almost as mucus plugs. No f/c/ns. No hemoptysis. + fatigue over last few months. Purposeful 30 lb wt loss over last 18 months which has not accelerated in last few months. No new rashes.   History of Present Illness             Review of Systems      Past Medical History[1]     Family History   Problem Relation Name Age of Onset    No Known Problems Mother      No Known Problems Father        If not mentioned in HPI, Family history is reviewed and not contributory    Social History[2]     Objective:        Vitals:    07/08/25 0921   BP: (!) 170/89   Pulse: 72     Wt Readings from Last 3 Encounters:   07/08/25 96.3 kg (212 lb 4.9 oz)   06/09/25 91.7 kg (202 lb 0.8 oz)   03/07/25 93.7 kg (206 lb 9.1 oz)     Temp Readings from Last 3 Encounters:   06/22/19 98 °F (36.7 °C) (Oral)     BP Readings from Last 3 Encounters:   07/08/25 (!) 170/89   06/09/25 128/68   03/07/25 128/62     Pulse Readings from Last 3 Encounters:   07/08/25 72   06/09/25 62   03/07/25 67       Physical Exam   Constitutional: He is oriented to person, place, and time. He appears well-developed and well-nourished.   HENT:   Head: Normocephalic.   Mouth/Throat: Oropharynx is clear and moist.   Cardiovascular: Normal  rate and regular rhythm.   Pulmonary/Chest: Normal expansion, symmetric chest wall expansion, effort normal and breath sounds normal. He has no wheezes.   Abdominal: Soft. He exhibits no distension.   Musculoskeletal:         General: No edema. Normal range of motion.   Lymphadenopathy: No supraclavicular adenopathy is present.     He has no cervical adenopathy.   Neurological: He is alert and oriented to person, place, and time. Gait normal.   Skin: Skin is warm and dry. No rash noted.   Psychiatric: He has a normal mood and affect. His behavior is normal. Thought content normal.   Vitals reviewed.    CBC  Lab Results   Component Value Date    WBC 6.95 01/08/2025    HGB 15.1 01/08/2025    HCT 43.5 01/08/2025    MCV 85 01/08/2025     01/08/2025         CMP  Sodium   Date Value Ref Range Status   01/08/2025 136 136 - 145 mmol/L Final     Potassium   Date Value Ref Range Status   01/08/2025 4.3 3.5 - 5.1 mmol/L Final     Comment:     Anion Gap reference range revised on 4/28/2023     Chloride   Date Value Ref Range Status   01/08/2025 102 95 - 110 mmol/L Final     CO2   Date Value Ref Range Status   01/08/2025 26 23 - 29 mmol/L Final     Glucose   Date Value Ref Range Status   01/08/2025 201 (H) 70 - 110 mg/dL Final     Comment:     The ADA recommends the following guidelines for fasting glucose:    Normal:       less than 100 mg/dL    Prediabetes:  100 mg/dL to 125 mg/dL    Diabetes:     126 mg/dL or higher       BUN   Date Value Ref Range Status   01/08/2025 20 8 - 23 mg/dL Final     Creatinine   Date Value Ref Range Status   01/08/2025 1.17 0.50 - 1.40 mg/dL Final     Calcium   Date Value Ref Range Status   01/08/2025 9.5 8.7 - 10.5 mg/dL Final     Total Protein   Date Value Ref Range Status   01/08/2025 6.7 6.0 - 8.4 g/dL Final     Albumin   Date Value Ref Range Status   01/08/2025 4.4 3.5 - 5.2 g/dL Final     Total Bilirubin   Date Value Ref Range Status   01/08/2025 1.3 (H) 0.2 - 1.0 mg/dL Final  "    Alkaline Phosphatase   Date Value Ref Range Status   01/08/2025 101 40 - 150 U/L Final     AST   Date Value Ref Range Status   01/08/2025 24 10 - 40 U/L Final     ALT   Date Value Ref Range Status   01/08/2025 27 10 - 44 U/L Final     Anion Gap   Date Value Ref Range Status   01/08/2025 8 8 - 16 mmol/L Final     Comment:     Anion Gap reference range revised on 4/28/2023     eGFR   Date Value Ref Range Status   01/08/2025 >60 >60 mL/min/1.73 m^2 Final       ABG  No results found for: "PH", "PO2", "PCO2"        Personal Diagnostic Review  I have personally reviewed the following data and added my own interpretation as below:  Extensive review of OSH records from multiple out of state institutions requiring prolonged visit, including, CT reports, ID and pulm notes, bloodwork going back years, etc      7/8/2025     9:21 AM 6/9/2025    11:32 AM 6/9/2025    10:52 AM 3/7/2025     8:52 AM 2/6/2025     2:35 PM 4/20/2023     3:36 PM 6/22/2019    12:33 PM   Pulmonary Function Tests   SpO2 98 % 98 % 79 % 97 % 97 %  100 %   Height 5' 10" (1.778 m)  5' 10" (1.778 m) 5' 10" (1.778 m)  5' 10" (1.778 m) 5' 10.5" (1.791 m)   Weight 96.3 kg (212 lb 4.9 oz)  91.7 kg (202 lb 0.8 oz) 93.7 kg (206 lb 9.1 oz) 95.3 kg (210 lb 1.6 oz) 104.3 kg (230 lb) 104.3 kg (230 lb)   BMI (Calculated) 30.5  29 29.6  33 32.6        Data saved with a previous flowsheet row definition         Assessment:       1. Immunosuppression    2. History of pulmonary Cryptococcus    3. Productive cough    4. Cryptococcal pneumonitis    5. Pneumonia due to infectious organism, unspecified laterality, unspecified part of lung    6. Kidney transplant status, living unrelated donor        Encounter Medications[3]  1. History of pulmonary Cryptococcus  - Ambulatory referral/consult to Pulmonology    2. Productive cough  - Ambulatory referral/consult to Pulmonology    3. Immunosuppression  - Culture, Respiratory with Gram Stain; Future  - AFB Culture & Smear; " Future  - AFB Culture & Smear; Future  - AFB Culture & Smear; Future  - Fungitell Assay For (1.3)-B-D-Glucans; Future  - CBC Auto Differential; Future  - Comprehensive Metabolic Panel; Future  - Cryptococcal antigen, blood  - Sedimentation rate; Future  - C-Reactive Protein (In-House); Future  - Fungus culture  - CT Chest Without Contrast; Future    4. Cryptococcal pneumonitis  - Culture, Respiratory with Gram Stain; Future  - AFB Culture & Smear; Future  - AFB Culture & Smear; Future  - AFB Culture & Smear; Future  - Fungitell Assay For (1.3)-B-D-Glucans; Future  - CBC Auto Differential; Future  - Comprehensive Metabolic Panel; Future  - Cryptococcal antigen, blood  - Sedimentation rate; Future  - C-Reactive Protein (In-House); Future  - Fungus culture  - CT Chest Without Contrast; Future    5. Pneumonia due to infectious organism, unspecified laterality, unspecified part of lung  - Culture, Respiratory with Gram Stain; Future  - AFB Culture & Smear; Future  - AFB Culture & Smear; Future  - AFB Culture & Smear; Future  - Fungitell Assay For (1.3)-B-D-Glucans; Future  - CBC Auto Differential; Future  - Comprehensive Metabolic Panel; Future  - Cryptococcal antigen, blood  - Sedimentation rate; Future  - C-Reactive Protein (In-House); Future  - Fungus culture  - CT Chest Without Contrast; Future    6. Kidney transplant status, living unrelated donor    Plan:     Problem List Items Addressed This Visit          Pulmonary    Productive cough    Current Assessment & Plan   Significant chronic condition to be followed longitudinally with following long term treatment plan.     Chronic mucus production in a significantly immunosuppressed patient with history of oppurtunistic infection is concerned infectious until proven otherwise.  -Blood work today including fungal testing  -Sputum cultures including fungal and AFB  -CT Chest now            Renal/    Kidney transplant status, living unrelated donor    Current  Assessment & Plan   Increased risk of oppurtunistic infections, especially nocardia.            ID    History of pulmonary Cryptococcus    Current Assessment & Plan   Treated for 6 months. Report of pre-treatment antigen 1:40. No repeat testing available.          Other Visit Diagnoses         Immunosuppression    -  Primary    Relevant Orders    Culture, Respiratory with Gram Stain    AFB Culture & Smear    AFB Culture & Smear    AFB Culture & Smear    Fungitell Assay For (1.3)-B-D-Glucans    CBC Auto Differential    Comprehensive Metabolic Panel    Cryptococcal antigen, blood    Sedimentation rate    C-Reactive Protein (In-House)    Fungus culture    CT Chest Without Contrast      Cryptococcal pneumonitis        Relevant Orders    Culture, Respiratory with Gram Stain    AFB Culture & Smear    AFB Culture & Smear    AFB Culture & Smear    Fungitell Assay For (1.3)-B-D-Glucans    CBC Auto Differential    Comprehensive Metabolic Panel    Cryptococcal antigen, blood    Sedimentation rate    C-Reactive Protein (In-House)    Fungus culture    CT Chest Without Contrast      Pneumonia due to infectious organism, unspecified laterality, unspecified part of lung        Relevant Orders    Culture, Respiratory with Gram Stain    AFB Culture & Smear    AFB Culture & Smear    AFB Culture & Smear    Fungitell Assay For (1.3)-B-D-Glucans    CBC Auto Differential    Comprehensive Metabolic Panel    Cryptococcal antigen, blood    Sedimentation rate    C-Reactive Protein (In-House)    Fungus culture    CT Chest Without Contrast            Assessment & Plan               Orders Placed This Encounter    AFB Culture & Smear     Standing Status:   Future     Expected Date:   7/8/2025     Expiration Date:   10/6/2026    AFB Culture & Smear     Standing Status:   Future     Expected Date:   7/8/2025     Expiration Date:   10/6/2026    AFB Culture & Smear     Standing Status:   Future     Expected Date:   7/8/2025     Expiration Date:    10/6/2026    C-Reactive Protein (In-House)     Standing Status:   Future     Expected Date:   7/8/2025     Expiration Date:   10/6/2026     Send normal result to authorizing provider's In Basket if patient is active on MyChart::   Yes    CBC Auto Differential     Standing Status:   Future     Expected Date:   7/8/2025     Expiration Date:   9/8/2026    Comprehensive Metabolic Panel     Standing Status:   Future     Expected Date:   7/8/2025     Expiration Date:   9/8/2026    Cryptococcal antigen, blood     Send normal result to authorizing provider's In Basket if patient is active on MyChart::   Yes    CT Chest Without Contrast     Standing Status:   Future     Expected Date:   7/8/2025     Expiration Date:   7/8/2026     May the Radiologist modify the order per protocol to meet the clinical needs of the patient?:   Yes    Culture, Respiratory with Gram Stain     Standing Status:   Future     Expiration Date:   7/8/2026     Send normal result to authorizing provider's In Basket if patient is active on MyChart::   Yes    Fungitell Assay For (1.3)-B-D-Glucans     Standing Status:   Future     Expected Date:   7/8/2025     Expiration Date:   9/8/2026    Fungus culture           Send normal result to authorizing provider's In Basket if patient is active on MyChart::   Yes    Sedimentation rate     Standing Status:   Future     Expected Date:   7/8/2025     Expiration Date:   10/6/2026     Send normal result to authorizing provider's In Basket if patient is active on MyChart::   Yes       Please note Overview Notes are historic documentation. Please review A/P for current updates.  No follow-ups on file.    I spent a total of 65 minutes on the day of the visit.This includes face to face time and non-face to face time preparing to see the patient (eg, review of tests), obtaining and/or reviewing separately obtained history, documenting clinical information in the electronic or other health record, independently  interpreting results and communicating results to the patient/family/caregiver, or care coordinator.      Future Appointments   Date Time Provider Department Center   7/28/2025  9:00 AM JENNY Walters MD [facility] [place]   12/9/2025  8:40 AM Maria Teresa Bean DO [facility] [place]           Deandre Caldwell MD           [1]   Past Medical History:  Diagnosis Date    Diabetes mellitus, type 2     H/O heart artery stent     Heart attack     Kidney disease    [2]   Social History  Tobacco Use    Smoking status: Never    Smokeless tobacco: Never   [3]   Outpatient Encounter Medications as of 7/8/2025   Medication Sig Dispense Refill    acetaminophen (TYLENOL) 500 MG tablet every 4 hours as needed      albuterol (PROVENTIL/VENTOLIN HFA) 90 mcg/actuation inhaler Inhale into the lungs every 4 (four) hours as needed.      amLODIPine (NORVASC) 5 MG tablet Take 5 mg by mouth.      ammonium lactate (LAC-HYDRIN) 12 % lotion Apply topically.      aspirin (ECOTRIN) 81 MG EC tablet Take 81 mg by mouth once daily.      atorvastatin (LIPITOR) 40 MG tablet Take 40 mg by mouth.      clobetasol 0.05% (TEMOVATE) 0.05 % Oint Apply twice a day to affected areas of skin on body until improved, up to 4-6 weeks, then can stop and restart as needed 60 g 1    diphenhydrAMINE (SOMINEX) 25 mg tablet Take 25 mg by mouth.      famotidine (PEPCID) 20 MG tablet Take 20 mg by mouth 2 (two) times daily.      folic acid (FOLVITE) 1 MG tablet Take 1 mg by mouth as needed.      HYDROcodone-acetaminophen (NORCO) 5-325 mg per tablet Take 1 tablet by mouth.      levothyroxine (SYNTHROID) 50 MCG tablet Take 1 tablet (50 mcg total) by mouth before breakfast. 90 tablet 3    losartan (COZAAR) 25 MG tablet Take 25 mg by mouth once daily.      magnesium oxide (MAG-OX) 400 mg (241.3 mg magnesium) tablet Take 400 mg by mouth.      meloxicam (MOBIC) 15 MG tablet Take 15 mg by mouth.      metoprolol succinate (TOPROL-XL) 25 MG 24 hr tablet Take 25  mg by mouth.      montelukast (SINGULAIR) 10 mg tablet Take 10 mg by mouth.      mupirocin (BACTROBAN) 2 % ointment Apply topically.      mycophenolate (CELLCEPT) 250 mg Cap Take by mouth.      NOVOLOG FLEXPEN U-100 INSULIN 100 unit/mL (3 mL) InPn pen uses up to 50 units daily Subcutaneous for 30 days      NOVOLOG U-100 INSULIN ASPART 100 unit/mL injection 100 units Injection via pump daily for 30 days      OMNIPOD 5 G6-G7 PODS, GEN 5, Crtg Inject into the skin.      OZEMPIC 2 mg/dose (8 mg/3 mL) PnIj Inject 2 mg into the skin.      PREDNISONE ORAL Take 3 mg by mouth once daily.      tacrolimus (PROGRAF) 1 MG Cap Take 2 mg by mouth.      tamsulosin (FLOMAX) 0.4 mg Cap Take 0.4 mg by mouth once daily.      traMADoL (ULTRAM) 50 mg tablet 1 tablet as needed Orally every 6 hrs       No facility-administered encounter medications on file as of 7/8/2025.

## 2025-07-08 NOTE — ASSESSMENT & PLAN NOTE
Chronic mucus production in a significantly immunosuppressed patient with history of oppurtunistic infection is concerned infectious until proven otherwise.  -Blood work today including fungal testing  -Sputum cultures including fungal and AFB  -CT Chest now

## 2025-07-21 ENCOUNTER — PATIENT MESSAGE (OUTPATIENT)
Dept: ADMINISTRATIVE | Facility: HOSPITAL | Age: 75
End: 2025-07-21
Payer: MEDICARE

## 2025-07-22 ENCOUNTER — HOSPITAL ENCOUNTER (OUTPATIENT)
Dept: RADIOLOGY | Facility: HOSPITAL | Age: 75
Discharge: HOME | End: 2025-07-22
Attending: INTERNAL MEDICINE
Payer: MEDICARE

## 2025-07-22 DIAGNOSIS — B45.0 CRYPTOCOCCAL PNEUMONITIS: ICD-10-CM

## 2025-07-22 DIAGNOSIS — D84.9 IMMUNOSUPPRESSION: ICD-10-CM

## 2025-07-22 DIAGNOSIS — J18.9 PNEUMONIA DUE TO INFECTIOUS ORGANISM, UNSPECIFIED LATERALITY, UNSPECIFIED PART OF LUNG: ICD-10-CM

## 2025-07-22 PROCEDURE — 71250 CT THORAX DX C-: CPT | Mod: 26,,, | Performed by: RADIOLOGY

## 2025-07-22 PROCEDURE — 71250 CT THORAX DX C-: CPT | Mod: TC,PO

## 2025-07-28 ENCOUNTER — OFFICE VISIT (OUTPATIENT)
Dept: UROLOGY | Facility: CLINIC | Age: 75
End: 2025-07-28
Payer: MEDICARE

## 2025-07-28 VITALS — HEIGHT: 70 IN | BODY MASS INDEX: 29.95 KG/M2 | WEIGHT: 209.19 LBS

## 2025-07-28 DIAGNOSIS — Z94.0 KIDNEY TRANSPLANT STATUS, LIVING UNRELATED DONOR: ICD-10-CM

## 2025-07-28 DIAGNOSIS — N40.1 ENLARGED PROSTATE WITH URINARY OBSTRUCTION: Primary | ICD-10-CM

## 2025-07-28 DIAGNOSIS — N13.8 ENLARGED PROSTATE WITH URINARY OBSTRUCTION: Primary | ICD-10-CM

## 2025-07-28 DIAGNOSIS — N52.01 ERECTILE DYSFUNCTION DUE TO ARTERIAL INSUFFICIENCY: ICD-10-CM

## 2025-07-28 PROCEDURE — 99212 OFFICE O/P EST SF 10 MIN: CPT | Mod: PBBFAC,PO | Performed by: UROLOGY

## 2025-07-28 PROCEDURE — 99204 OFFICE O/P NEW MOD 45 MIN: CPT | Mod: S$PBB,,, | Performed by: UROLOGY

## 2025-07-28 PROCEDURE — 99999 PR PBB SHADOW E&M-EST. PATIENT-LVL II: CPT | Mod: PBBFAC,,, | Performed by: UROLOGY

## 2025-07-28 RX ORDER — PAPAVERINE HYDROCHLORIDE 30 MG/ML
INJECTION INTRAMUSCULAR; INTRAVENOUS
Qty: 5 ML | Refills: 5 | Status: SHIPPED | OUTPATIENT
Start: 2025-07-28

## 2025-07-28 RX ORDER — TAMSULOSIN HYDROCHLORIDE 0.4 MG/1
0.4 CAPSULE ORAL DAILY
Qty: 90 CAPSULE | Refills: 3 | Status: SHIPPED | OUTPATIENT
Start: 2025-07-28

## 2025-07-28 NOTE — PROGRESS NOTES
Subjective:       Patient ID: Stalin Braswell is a 75 y.o. male.    Chief Complaint: Establish Care    HPI    CHIEF COMPLAINT:  Patient presents today for erectile dysfunction management and to establish care with a new urologist.    ERECTILE DYSFUNCTION:  He reports a 10-year history of erectile dysfunction. Most erectile dysfunction medications have been ineffective, only producing partial erections that are not sufficient for intercourse. He is interested in exploring alternative treatments including injection therapies and external treatments.    MEDICAL HISTORY:  He has history of kidney transplant in 2018 with wife as donor, prior kidney stones (resolved), myocardial infarction requiring stent placement, and diabetes which is now better controlled.    Past Medical History:   Diagnosis Date    Diabetes mellitus, type 2     H/O heart artery stent     Heart attack     Kidney disease      Past Surgical History:   Procedure Laterality Date    APPENDECTOMY      BICEPS TENDON REPAIR Left     CATARACT EXTRACTION, BILATERAL      CORONARY ANGIOPLASTY WITH STENT PLACEMENT  04/2018    kidney transplant with flap  04/2019    SURGICAL REMOVAL OF EXOSTOSIS OF TOE Left     2nd toe on left foot    TOE AMPUTATION      glass in it     Current Outpatient Medications:     acetaminophen (TYLENOL) 500 MG tablet, every 4 hours as needed, Disp: , Rfl:     albuterol (PROVENTIL/VENTOLIN HFA) 90 mcg/actuation inhaler, Inhale into the lungs every 4 (four) hours as needed., Disp: , Rfl:     amLODIPine (NORVASC) 5 MG tablet, Take 5 mg by mouth., Disp: , Rfl:     ammonium lactate (LAC-HYDRIN) 12 % lotion, Apply topically., Disp: , Rfl:     aspirin (ECOTRIN) 81 MG EC tablet, Take 81 mg by mouth once daily., Disp: , Rfl:     atorvastatin (LIPITOR) 40 MG tablet, Take 40 mg by mouth., Disp: , Rfl:     clobetasol 0.05% (TEMOVATE) 0.05 % Oint, Apply twice a day to affected areas of skin on body until improved, up to 4-6 weeks, then can stop  and restart as needed, Disp: 60 g, Rfl: 1    diphenhydrAMINE (SOMINEX) 25 mg tablet, Take 25 mg by mouth., Disp: , Rfl:     famotidine (PEPCID) 20 MG tablet, Take 20 mg by mouth 2 (two) times daily., Disp: , Rfl:     folic acid (FOLVITE) 1 MG tablet, Take 1 mg by mouth as needed., Disp: , Rfl:     HYDROcodone-acetaminophen (NORCO) 5-325 mg per tablet, Take 1 tablet by mouth., Disp: , Rfl:     levothyroxine (SYNTHROID) 50 MCG tablet, Take 1 tablet (50 mcg total) by mouth before breakfast., Disp: 90 tablet, Rfl: 3    losartan (COZAAR) 25 MG tablet, Take 25 mg by mouth once daily., Disp: , Rfl:     magnesium oxide (MAG-OX) 400 mg (241.3 mg magnesium) tablet, Take 400 mg by mouth., Disp: , Rfl:     meloxicam (MOBIC) 15 MG tablet, Take 15 mg by mouth., Disp: , Rfl:     metoprolol succinate (TOPROL-XL) 25 MG 24 hr tablet, Take 25 mg by mouth., Disp: , Rfl:     montelukast (SINGULAIR) 10 mg tablet, Take 10 mg by mouth., Disp: , Rfl:     mupirocin (BACTROBAN) 2 % ointment, Apply topically., Disp: , Rfl:     mycophenolate (CELLCEPT) 250 mg Cap, Take by mouth., Disp: , Rfl:     NOVOLOG FLEXPEN U-100 INSULIN 100 unit/mL (3 mL) InPn pen, uses up to 50 units daily Subcutaneous for 30 days, Disp: , Rfl:     NOVOLOG U-100 INSULIN ASPART 100 unit/mL injection, 100 units Injection via pump daily for 30 days, Disp: , Rfl:     OMNIPOD 5 G6-G7 PODS, GEN 5, Crtg, Inject into the skin., Disp: , Rfl:     OZEMPIC 2 mg/dose (8 mg/3 mL) PnIj, Inject 2 mg into the skin., Disp: , Rfl:     PREDNISONE ORAL, Take 3 mg by mouth once daily., Disp: , Rfl:     tacrolimus (PROGRAF) 1 MG Cap, Take 2 mg by mouth., Disp: , Rfl:     traMADoL (ULTRAM) 50 mg tablet, 1 tablet as needed Orally every 6 hrs, Disp: , Rfl:     papaverine 30 mg/mL injection, Trimix: Papaverine 300 mg (30 ml/ml)  mg (20 mcg/ml Phentolamine 10 mg (1 mg/ml).  Inject 20 units as directed.  Titrate up to lowest effective dose., Disp: 5 mL, Rfl: 5    tamsulosin (FLOMAX) 0.4 mg  Cap, Take 1 capsule (0.4 mg total) by mouth once daily., Disp: 90 capsule, Rfl: 3    MEDICATIONS:  He takes Flomax 0.4 mg for urinary symptoms, particularly frequent urination during air travel, and Prograf for post-transplant management.      ROS:  General: -fever, -chills, -fatigue, -weight gain, -weight loss  Eyes: -vision changes, -redness, -discharge  ENT: -ear pain, -nasal congestion, -sore throat  Cardiovascular: -chest pain, -palpitations, -lower extremity edema  Respiratory: -cough, -shortness of breath  Gastrointestinal: -abdominal pain, -nausea, -vomiting, -diarrhea, -constipation, -blood in stool  Genitourinary: -dysuria, -hematuria, +frequency  Musculoskeletal: -joint pain, -muscle pain, +muscle weakness  Skin: -rash, -lesion  Neurological: -headache, -dizziness, -numbness, -tingling  Psychiatric: -anxiety, -depression, -sleep difficulty  Male Genitourinary: +erectile dysfunction             Objective:        Physical Exam  Vitals reviewed.   Constitutional:       Appearance: He is well-developed.   Pulmonary:      Effort: Pulmonary effort is normal.   Neurological:      Mental Status: He is alert and oriented to person, place, and time.           Assessment & Plan    N52.1 Erectile dysfunction due to diseases classified elsewhere  Z94.0 Kidney transplant status  I25.2 Old myocardial infarction  Z95.5 Presence of coronary angioplasty implant and graft  E11.9 Type 2 diabetes mellitus without complications  N40.1 Benign prostatic hyperplasia with lower urinary tract symptoms  Z87.442 Personal history of urinary calculi    ERECTILE DYSFUNCTION:  - Assessed history of erectile dysfunction (ED) and current treatment options, considering kidney transplant and use of immunosuppressants as contributing factors.  - Determined injection therapy (Trimix) as appropriate next step for ED treatment, given lack of response to oral medications.  - Explained ED treatment options: oral medications, injection therapy,  vacuum pump, and penile prosthesis.  - Educated on the mechanism of action for injection therapy, emphasizing its direct effect regardless of arousal.  - Discussed importance of proper dosing to achieve desired effect without prolonged erection.  - Started Trimix for ED treatment: Initial dose: 20 units (0.2 cc). Administration: Inject into side of penis at 3 or 9 o'clock position. Technique: Clean with alcohol, inject, hold pressure for about a minute. Onset: Expect erection after about 15 minutes. Adjust dose as needed for firm erection without prolonged duration. Store in refrigerator to preserve potency. Provided detailed instructions on proper injection technique including anatomical guidance and injection site selection. Informed about gradual loss of potency of the medication over time.    BENIGN PROSTATIC HYPERPLASIA WITH LOWER URINARY TRACT SYMPTOMS:  - Evaluated current use of Flomax for urinary symptoms.  - Continued Flomax for urinary symptoms.        Assessment:         1. Enlarged prostate with urinary obstruction    2. Erectile dysfunction due to arterial insufficiency    3. Kidney transplant status, living unrelated donor          This note was generated with the assistance of ambient listening technology. I attest to having reviewed and edited the generated note for accuracy, though some syntax or spelling errors may persist. Please contact the author of this note for any clarification.    Plan:       Enlarged prostate with urinary obstruction    Erectile dysfunction due to arterial insufficiency    Kidney transplant status, living unrelated donor    Other orders  -     papaverine 30 mg/mL injection; Trimix: Papaverine 300 mg (30 ml/ml)  mg (20 mcg/ml Phentolamine 10 mg (1 mg/ml).  Inject 20 units as directed.  Titrate up to lowest effective dose.  Dispense: 5 mL; Refill: 5  -     tamsulosin (FLOMAX) 0.4 mg Cap; Take 1 capsule (0.4 mg total) by mouth once daily.  Dispense: 90 capsule; Refill:  3

## 2025-08-21 ENCOUNTER — OFFICE VISIT (OUTPATIENT)
Dept: PULMONOLOGY | Facility: CLINIC | Age: 75
End: 2025-08-21
Payer: MEDICARE

## 2025-08-21 ENCOUNTER — HOSPITAL ENCOUNTER (OUTPATIENT)
Dept: RADIOLOGY | Facility: HOSPITAL | Age: 75
Discharge: HOME | End: 2025-08-21
Attending: INTERNAL MEDICINE
Payer: MEDICARE

## 2025-08-21 VITALS
BODY MASS INDEX: 30.58 KG/M2 | OXYGEN SATURATION: 97 % | SYSTOLIC BLOOD PRESSURE: 154 MMHG | DIASTOLIC BLOOD PRESSURE: 84 MMHG | HEIGHT: 70 IN | WEIGHT: 213.63 LBS | HEART RATE: 66 BPM

## 2025-08-21 DIAGNOSIS — Z94.0 KIDNEY TRANSPLANT STATUS, LIVING UNRELATED DONOR: ICD-10-CM

## 2025-08-21 DIAGNOSIS — B45.9 CRYPTOCOCCUS: ICD-10-CM

## 2025-08-21 DIAGNOSIS — R91.1 LUNG NODULE: Primary | ICD-10-CM

## 2025-08-21 DIAGNOSIS — R04.2 HEMOPTYSIS: ICD-10-CM

## 2025-08-21 DIAGNOSIS — R05.8 PRODUCTIVE COUGH: ICD-10-CM

## 2025-08-21 PROCEDURE — 71046 X-RAY EXAM CHEST 2 VIEWS: CPT | Mod: 26,,, | Performed by: STUDENT IN AN ORGANIZED HEALTH CARE EDUCATION/TRAINING PROGRAM

## 2025-08-21 PROCEDURE — 99212 OFFICE O/P EST SF 10 MIN: CPT | Mod: PBBFAC,25 | Performed by: INTERNAL MEDICINE

## 2025-08-21 PROCEDURE — 99999 PR PBB SHADOW E&M-EST. PATIENT-LVL II: CPT | Mod: PBBFAC,,, | Performed by: INTERNAL MEDICINE

## 2025-08-21 PROCEDURE — 71046 X-RAY EXAM CHEST 2 VIEWS: CPT | Mod: TC,PO

## 2025-08-21 RX ORDER — OMEPRAZOLE 40 MG/1
40 CAPSULE, DELAYED RELEASE ORAL
Qty: 30 CAPSULE | Refills: 11 | Status: SHIPPED | OUTPATIENT
Start: 2025-08-21

## 2025-08-21 RX ORDER — AMOXICILLIN AND CLAVULANATE POTASSIUM 875; 125 MG/1; MG/1
1 TABLET, FILM COATED ORAL 2 TIMES DAILY
Qty: 10 TABLET | Refills: 0 | Status: SHIPPED | OUTPATIENT
Start: 2025-08-21 | End: 2025-08-26

## 2025-09-03 ENCOUNTER — TELEPHONE (OUTPATIENT)
Dept: FAMILY MEDICINE | Facility: CLINIC | Age: 75
End: 2025-09-03
Payer: MEDICARE